# Patient Record
Sex: MALE | Race: WHITE
[De-identification: names, ages, dates, MRNs, and addresses within clinical notes are randomized per-mention and may not be internally consistent; named-entity substitution may affect disease eponyms.]

---

## 2017-02-03 ENCOUNTER — HOSPITAL ENCOUNTER (OUTPATIENT)
Dept: HOSPITAL 80 - FED | Age: 62
Setting detail: OBSERVATION
LOS: 1 days | Discharge: NURSING, REHAB OR LONG-TERM CARE | End: 2017-02-04
Attending: INTERNAL MEDICINE | Admitting: INTERNAL MEDICINE
Payer: MEDICAID

## 2017-02-03 DIAGNOSIS — I10: ICD-10-CM

## 2017-02-03 DIAGNOSIS — J70.4: ICD-10-CM

## 2017-02-03 DIAGNOSIS — Z79.2: ICD-10-CM

## 2017-02-03 DIAGNOSIS — E11.9: ICD-10-CM

## 2017-02-03 DIAGNOSIS — Z87.440: ICD-10-CM

## 2017-02-03 DIAGNOSIS — E66.01: ICD-10-CM

## 2017-02-03 DIAGNOSIS — G47.33: ICD-10-CM

## 2017-02-03 DIAGNOSIS — T46.4X5A: ICD-10-CM

## 2017-02-03 DIAGNOSIS — F17.200: ICD-10-CM

## 2017-02-03 DIAGNOSIS — Z79.01: ICD-10-CM

## 2017-02-03 DIAGNOSIS — F41.9: ICD-10-CM

## 2017-02-03 DIAGNOSIS — Z96.651: ICD-10-CM

## 2017-02-03 DIAGNOSIS — E87.1: ICD-10-CM

## 2017-02-03 DIAGNOSIS — Z86.711: ICD-10-CM

## 2017-02-03 DIAGNOSIS — T78.3XXA: Primary | ICD-10-CM

## 2017-02-03 DIAGNOSIS — T84.53XD: ICD-10-CM

## 2017-02-03 DIAGNOSIS — I50.32: ICD-10-CM

## 2017-02-03 DIAGNOSIS — E87.5: ICD-10-CM

## 2017-02-03 DIAGNOSIS — F25.9: ICD-10-CM

## 2017-02-03 DIAGNOSIS — K21.9: ICD-10-CM

## 2017-02-03 DIAGNOSIS — J44.9: ICD-10-CM

## 2017-02-03 DIAGNOSIS — N31.9: ICD-10-CM

## 2017-02-03 LAB
% IMMATURE GRANULYOCYTES: 0.7 % (ref 0–1.1)
ABSOLUTE IMMATURE GRANULOCYTES: 0.03 10^3/UL (ref 0–0.1)
ABSOLUTE NRBC COUNT: 0 10^3/UL (ref 0–0.01)
ADD DIFF?: NO
ADD MORPH?: NO
ADD SCAN?: NO
ANION GAP SERPL CALC-SCNC: 6 MEQ/L (ref 8–16)
ANION GAP SERPL CALC-SCNC: 6 MEQ/L (ref 8–16)
ANION GAP SERPL CALC-SCNC: 7 MEQ/L (ref 8–16)
APTT BLD: 30.3 SEC (ref 23–38)
ATYPICAL LYMPHOCYTE FLAG: 0 (ref 0–99)
CALCIUM SERPL-MCNC: 8.3 MG/DL (ref 8.5–10.4)
CALCIUM SERPL-MCNC: 8.6 MG/DL (ref 8.5–10.4)
CALCIUM SERPL-MCNC: 8.9 MG/DL (ref 8.5–10.4)
CHLORIDE SERPL-SCNC: 96 MEQ/L (ref 97–110)
CHLORIDE SERPL-SCNC: 96 MEQ/L (ref 97–110)
CHLORIDE SERPL-SCNC: 97 MEQ/L (ref 97–110)
CO2 SERPL-SCNC: 28 MEQ/L (ref 22–31)
CREAT SERPL-MCNC: 0.8 MG/DL (ref 0.7–1.3)
CREAT SERPL-MCNC: 0.9 MG/DL (ref 0.7–1.3)
CREAT SERPL-MCNC: 0.9 MG/DL (ref 0.7–1.3)
ERYTHROCYTE [DISTWIDTH] IN BLOOD BY AUTOMATED COUNT: 13.4 % (ref 11.5–15.2)
FRAGMENT RBC FLAG: 0 (ref 0–99)
GFR SERPL CREATININE-BSD FRML MDRD: > 60 ML/MIN/{1.73_M2}
GLUCOSE SERPL-MCNC: 103 MG/DL (ref 70–100)
GLUCOSE SERPL-MCNC: 106 MG/DL (ref 70–100)
GLUCOSE SERPL-MCNC: 106 MG/DL (ref 70–100)
HCT VFR BLD CALC: 40.8 % (ref 40–51)
HGB BLD-MCNC: 14.1 G/DL (ref 13.7–17.5)
INR PPP: 1.18 (ref 0.83–1.16)
LEFT SHIFT FLG: 0 (ref 0–99)
LIPEMIA HEMOLYSIS FLAG: 90 (ref 0–99)
MCH RBC BLDCO QN: 33.8 PG (ref 27.9–34.1)
MCHC RBC AUTO-ENTMCNC: 34.6 G/DL (ref 32.4–36.7)
MCV RBC AUTO: 97.8 FL (ref 81.5–99.8)
NRBC-AUTO%: 0 % (ref 0–0.2)
PLATELET # BLD: 140 10^3/UL (ref 150–400)
PLATELET CLUMPS FLAG: 10 (ref 0–99)
PMV BLD AUTO: 10.5 FL (ref 8.7–11.7)
POTASSIUM SERPL-SCNC: 4.8 MEQ/L (ref 3.5–5.2)
POTASSIUM SERPL-SCNC: 5.4 MEQ/L (ref 3.5–5.2)
POTASSIUM SERPL-SCNC: 5.5 MEQ/L (ref 3.5–5.2)
PROTHROMBIN TIME: 15 SEC (ref 12–15)
RBC # BLD AUTO: 4.17 10^6/UL (ref 4.4–6.38)
SODIUM SERPL-SCNC: 130 MEQ/L (ref 134–144)
SODIUM SERPL-SCNC: 131 MEQ/L (ref 134–144)
SODIUM SERPL-SCNC: 131 MEQ/L (ref 134–144)

## 2017-02-03 PROCEDURE — 99285 EMERGENCY DEPT VISIT HI MDM: CPT

## 2017-02-03 PROCEDURE — G0378 HOSPITAL OBSERVATION PER HR: HCPCS

## 2017-02-03 PROCEDURE — 97165 OT EVAL LOW COMPLEX 30 MIN: CPT

## 2017-02-03 RX ADMIN — FUROSEMIDE SCH MG: 20 TABLET ORAL at 13:22

## 2017-02-03 RX ADMIN — IPRATROPIUM BROMIDE AND ALBUTEROL SCH PUFFS: 20; 100 SPRAY, METERED RESPIRATORY (INHALATION) at 20:32

## 2017-02-03 RX ADMIN — ACETAMINOPHEN SCH MG: 500 TABLET ORAL at 13:19

## 2017-02-03 RX ADMIN — ACETAMINOPHEN SCH: 500 TABLET ORAL at 23:43

## 2017-02-03 RX ADMIN — CALCIUM SCH MG: 500 TABLET ORAL at 20:05

## 2017-02-03 RX ADMIN — AMOXICILLIN AND CLAVULANATE POTASSIUM SCH MG: 875; 125 TABLET, FILM COATED ORAL at 20:05

## 2017-02-03 RX ADMIN — OXYBUTYNIN CHLORIDE SCH MG: 5 TABLET ORAL at 20:05

## 2017-02-03 RX ADMIN — CHLORHEXIDINE GLUCONATE SCH ML: 1.2 RINSE ORAL at 20:06

## 2017-02-03 NOTE — GHP
[f 
rep st]



                                                            HISTORY AND PHYSICAL





DATE OF ADMISSION:  2017



DATE OF EVALUATION:  2017



CHIEF COMPLAINT:  Tongue swelling.



HISTORY OF PRESENT ILLNESS:  This is a 61-year-old man who is a permanent [place] resident who presents with tongue swelling.  He was started on lisinopril 
for hypertension about 3 months ago.  He has never had any episodes of this 
before.  It started last night and got progressively worse through the night.  
He has no problems breathing.  He is having difficulty speaking due to the size 
of his tongue.  He feels as though his tongue is actually improving at this 
point as his speaking has become easier than it was previously. 



He has a history of PE, on Coumadin.  Recent dose adjustments appeared to have 
been made as he had an INR of 4.1, on 2017.



PAST MEDICAL/SURGICAL HISTORY:  

1.  History of a pulmonary embolus, on Coumadin, with subtherapeutic INR.

2.  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 
suppressive antibiotics.

3.  History of recurrent urinary tract infections, including ESBL.

4.  Neurogenic bladder.

5.  Type 2 diabetes.

6.  Schizoaffective disorder.

7.  Diastolic CHF.

8.  Obstructive sleep apnea.

9.  Anxiety.

10.  Macrodantin-induced pneumonitis.

11.  Chronic hyponatremia.

12.  Chronic lower extremity edema.

13.  COPD.

14.  GERD.



MEDICATIONS:  Please see medication reconciliation.



ALLERGIES:  Atenolol, beta adrenergic agents, sotalol, Macrobid, lisinopril.



SOCIAL HISTORY:  He lives in [place].  He smokes.  His sister is his POA.



FAMILY HISTORY:  Father  from trauma.



REVIEW OF SYSTEMS:  A 10-point review of systems is conducted and is negative 
except per HPI.



PHYSICAL EXAMINATION:  VITAL SIGNS:  Blood pressure 130/88, heart rate 67, 
respiration rate 20, saturating 95% on 1.5 L.  GENERAL:  The patient is a 
pleasant man who is lying in bed, comfortable, in no acute distress.  HEENT:  
Shows him to have a significantly edematous tongue.  He is able to speak, open 
his mouth.  I can barely see the upper back part of his palate.  CARDIOVASCULAR
:  Regular rate and rhythm.  No murmurs, rubs, or gallops.  PULMONARY:  Lungs 
are clear to auscultation bilaterally.  ABDOMEN:  Soft, nontender, 
nondistended.  SKIN:  No rash. :  A Castaneda in place.  NEUROLOGIC:  Shows him 
to be alert and oriented x3.  He is moving all extremities.  PSYCHIATRIC:  
Normal mood and affect.



LABORATORY DATA:  CBC is unremarkable.  INR is 1.18, sodium 131, potassium 5.5.
  



I reviewed his chart, including his chart from [facility], as 
well as [place] chart.



IMPRESSION AND PLAN:  A 61-year-old man who presents with angioedema.

1.  Angioedema:  ACE inhibitor induced.  Primary role is supportive.  It seems 
to be slowly improving at this point.  If it worsens, he may need intubation 
with mechanical ventilation.  Airway may be difficult, would potentially need 
emergent tracheostomy.  Monitor him on pulse oximetry.  Will give one dose of 
pepcid and benadryl.  Adjunctive treatments include Icatibant , FFP, purified 
C1 inhibitor concentrate, or Ecallantide.  I have added lisinopril to his 
allergy list.  We will hold this.

2.  Hyperkalemia.  This seems to be somewhat of a chronic problem for him.  I 
have written to recheck his labs.

3.  Hyponatremia, also somewhat of a chronic problem.  We will follow.

4.  COPD.  He has somewhat chronic respiratory failure, on oxygen.  Will 
continue.

5.  History of infected knee replacement.  We will continue his suppressive 
antibiotics.

6.  History of a PE with subtherapeutic INR.  We will continue his warfarin and 
recheck his INR tomorrow.

7.  Schizoaffective disorder.  Continue his medicines.

8.  Chronic diastolic dysfunction and lower extremity edema:  We will continue 
his furosemide.

9.  Hypertension.  Hold lisinopril.  Restart his other medicines.  May need to 
add adjunctive treatment.

10.  Diabetes mellitus.  Continue his oral medicines.

11.  Code status is full.  His sister is his POA.

12.  VTE risk.  He is currently admitted as obs.  If he stays, would start 
either bridging-dose Lovenox or at least therapeutic-dose Lovenox.





Job #:  [id number]

MTDD

## 2017-02-03 NOTE — EDPHY
HPI/HX/ROS/PE/MDM


Narrative: 


CHIEF COMPLAINT: Tongue swelling





HPI: The patient is a 62 y/o male arriving via EMS complaining of worsening 

tongue swelling since midnight, about 7 hours prior to arrival. He has a 

history of hypertension for which he was started on lisinopril a few months 

ago. He has never had this reaction before. He denies new exposures to foods, 

changes in medications, or insect bites. No shortness of breath, sensation of 

throat tightness, difficulty speaking or talking, or other complaints. 





REVIEW OF SYSTEMS:


Aside from elements discussed in the HPI, a comprehensive 10-point review of 

systems was reviewed and is negative.





PMH: Type 2 diabetes, schizoaffective disorder, CHF with diastolic dysfunction, 

anxiety, right knee arthroplasty, recurrent UTIs with chronic Castaneda, chronic 

hyponatremia, anxiety, DVT on Coumadin





Prior medical records reviewed including admission 1/17/14 for malaise. 





SOCIAL HISTORY: Lives at [place]





PHYSICAL EXAM:


General:Patient is alert, in no acute distress.


ENT:Eyes are normal to inspection.  Enlarged tongue, mild drooling, normal 

voice. ENT inspection otherwise normal


Neck: Normal inspection.  Full range of motion.


Respiratory:No respiratory distress.  Breath sounds normal bilaterally.


Cardiovascular: Regular rate and rhythm.  Strong peripheral pulses.  Normal cap 

refill.


Abdomen:The abdomen is nontender to palpation. There are no peritoneal signs. 

There are normal bowel sounds.


Back: Normal to inspection.  No tenderness to palpation.


Skin: Normal color.  No rash.  Warm and dry.


Extremities: Full range of motion. 1-2+ pitting edema bilaterally


Neuro: Oriented x3.  Normal motor function.  Normal sensory function.


ED Course: 


0720: Met EMS upon arrival and took report. IV established. Labs drawn 

including CBC, CHEM, PTPTT, BNP. Plan for admission for angioedema.





0855: Spoke with hospitalist service. Dr. Herrera accepts admission. 


MDM: 


This patient presents with swollen tongue that appears to be secondary to 

angioedema.  Given recent initiation of lisinopril therapy, I suspect this is 

likely the etiology.  The patient has a stable airway here and does not require 

intubation.  He requires admission to the hospital for further observation and 

treatment however.





- Data Points


Laboratory Results: 


 Laboratory Results





 02/03/17 07:35 





 02/03/17 07:35 





 











  02/03/17





  07:35


 


WBC  4.56 10^3/uL





   (3.80-9.50) 


 


RBC  4.17 L 10^6/uL





   (4.40-6.38) 


 


Hgb  14.1 g/dL





   (13.7-17.5) 


 


Hct  40.8 %





   (40.0-51.0) 


 


MCV  97.8 fL





   (81.5-99.8) 


 


MCH  33.8 pg





   (27.9-34.1) 


 


MCHC  34.6 g/dL





   (32.4-36.7) 


 


RDW  13.4 %





   (11.5-15.2) 


 


Plt Count  140 L 10^3/uL





   (150-400) 


 


MPV  10.5 fL





   (8.7-11.7) 


 


Neut % (Auto)  66.0 %





   (39.3-74.2) 


 


Lymph % (Auto)  17.3 %





   (15.0-45.0) 


 


Mono % (Auto)  13.6 H %





   (4.5-13.0) 


 


Eos % (Auto)  2.0 %





   (0.6-7.6) 


 


Baso % (Auto)  0.4 %





   (0.3-1.7) 


 


Nucleat RBC Rel Count  0.0 %





   (0.0-0.2) 


 


Absolute Neuts (auto)  3.01 10^3/uL





   (1.70-6.50) 


 


Absolute Lymphs (auto)  0.79 L 10^3/uL





   (1.00-3.00) 


 


Absolute Monos (auto)  0.62 10^3/uL





   (0.30-0.80) 


 


Absolute Eos (auto)  0.09 10^3/uL





   (0.03-0.40) 


 


Absolute Basos (auto)  0.02 10^3/uL





   (0.02-0.10) 


 


Absolute Nucleated RBC  0.00 10^3/uL





   (0-0.01) 


 


Immature Gran %  0.7 %





   (0.0-1.1) 


 


Immature Gran #  0.03 10^3/uL





   (0.00-0.10) 


 


PT  15.0 SEC





   (12.0-15.0) 


 


INR  1.18 H 





   (0.83-1.16) 


 


APTT  30.3 SEC





   (23.0-38.0) 


 


Sodium  131 L mEq/L





   (134-144) 


 


Potassium  5.5 H mEq/L





   (3.5-5.2) 


 


Chloride  97 mEq/L





   () 


 


Carbon Dioxide  28 mEq/l





   (22-31) 


 


Anion Gap  6 mEq/L





   (8-16) 


 


BUN  28 H mg/dL





   (7-23) 


 


Creatinine  0.9 mg/dL





   (0.7-1.3) 


 


Estimated GFR  > 60 





  


 


Glucose  103 H mg/dL





   () 


 


Calcium  8.6 mg/dL





   (8.5-10.4) 


 


NT-Pro-B Natriuret Pep  144 H pg/mL





   (0-125) 














General


Initial Vital Signs: 


 Initial Vital Signs











Temperature (C)  36.5 C   02/03/17 07:20


 


Heart Rate  71   02/03/17 07:20


 


Respiratory Rate  20   02/03/17 07:20


 


Blood Pressure  119/93 H  02/03/17 07:20


 


O2 Sat (%)  90 L  02/03/17 07:20








 











O2 Delivery Mode               Room Air


 


O2 (L/minute)                  2














Allergies/Adverse Reactions: 


 





atenolol Allergy (Unknown, Verified 01/17/14 16:09)


 Unknown


Beta-Adrenergic Agents Allergy (Unknown, Verified 01/17/14 16:09)


 Unknown


hydrochlorothiazide Allergy (Unknown, Verified 01/17/14 16:09)


 Unknown


sotalol [Sotalol] Allergy (Unknown, Verified 01/17/14 16:09)


 Unknown


nitrofurantoin [From Macrobid] Allergy (Verified 01/17/14 16:09)


 


nitrofurantoin macrocrystalline [From Macrobid] Allergy (Verified 01/17/14 16:09

)


 








Home Medications: 














 Medication  Instructions  Recorded


 


Albuterol  02/03/17


 


Amox Tr/Potassium Clavulanate  02/03/17





[Augmentin 1000MG ER Tablet (*)]  


 


CHLORHEX GL/GLYCERIN/HE-CELL  02/03/17


 


CLONAZEPAM  02/03/17


 


Calcium Carbonate  02/03/17


 


Divalproex ER [Depakote  MG  02/03/17





(*)]  


 


Dulcolax  02/03/17


 


FLUoxetine  02/03/17


 


Ipratropium/Albuterol  02/03/17


 


Lactobacillus Combo No.11  02/03/17





[Probiotic]  


 


Lasix  02/03/17


 


Lisinopril  02/03/17


 


MAGNESIUM  02/03/17


 


Magnesium Oxide  02/03/17


 


Metformin 1000 mg  02/03/17


 


QUEtiapine FUMARATE [Seroquel 100  02/03/17





mg (*)]  


 


THERMOTABS TABLET  02/03/17


 


Tylenol  02/03/17


 


Warfarin Sodium [Coumadin] 5 mg PO 02/03/17














Departure





- Departure


Disposition: [facility] Inpatient Acute


Clinical Impression: 


Angioedema


Qualifiers:


 Qualifier Code: (T78.3XXA) Angioneurotic edema, initial encounter


Condition: Fair


Report Scribed for: Gen Torres


Report Scribed by: Lisa Conroy


Date of Report: 02/03/17


Time of Report: 07:25


Physician Review and Approval Statement: Portions of this note were transcribed 

by an ED scribe.  I personally performed the history, physical exam, and 

medical decision making; and confirm the accuracy of the information in the 

transcribed note.

## 2017-02-04 VITALS — RESPIRATION RATE: 13 BRPM

## 2017-02-04 VITALS
SYSTOLIC BLOOD PRESSURE: 150 MMHG | HEART RATE: 73 BPM | TEMPERATURE: 97.9 F | DIASTOLIC BLOOD PRESSURE: 92 MMHG | OXYGEN SATURATION: 91 %

## 2017-02-04 LAB
% IMMATURE GRANULYOCYTES: 0.5 % (ref 0–1.1)
ABSOLUTE IMMATURE GRANULOCYTES: 0.02 10^3/UL (ref 0–0.1)
ABSOLUTE NRBC COUNT: 0 10^3/UL (ref 0–0.01)
ADD DIFF?: NO
ADD MORPH?: NO
ADD SCAN?: NO
ANION GAP SERPL CALC-SCNC: 4 MEQ/L (ref 8–16)
ATYPICAL LYMPHOCYTE FLAG: 10 (ref 0–99)
CALCIUM SERPL-MCNC: 8.4 MG/DL (ref 8.5–10.4)
CHLORIDE SERPL-SCNC: 102 MEQ/L (ref 97–110)
CO2 SERPL-SCNC: 29 MEQ/L (ref 22–31)
CREAT SERPL-MCNC: 0.8 MG/DL (ref 0.7–1.3)
ERYTHROCYTE [DISTWIDTH] IN BLOOD BY AUTOMATED COUNT: 13.4 % (ref 11.5–15.2)
FRAGMENT RBC FLAG: 0 (ref 0–99)
GFR SERPL CREATININE-BSD FRML MDRD: > 60 ML/MIN/{1.73_M2}
GLUCOSE SERPL-MCNC: 105 MG/DL (ref 70–100)
HCT VFR BLD CALC: 39.4 % (ref 40–51)
HGB BLD-MCNC: 13.4 G/DL (ref 13.7–17.5)
INR PPP: 1.56 (ref 0.83–1.16)
LEFT SHIFT FLG: 0 (ref 0–99)
LIPEMIA HEMOLYSIS FLAG: 90 (ref 0–99)
MCH RBC BLDCO QN: 33.6 PG (ref 27.9–34.1)
MCHC RBC AUTO-ENTMCNC: 34 G/DL (ref 32.4–36.7)
MCV RBC AUTO: 98.7 FL (ref 81.5–99.8)
NRBC-AUTO%: 0 % (ref 0–0.2)
PLATELET # BLD: 129 10^3/UL (ref 150–400)
PLATELET CLUMPS FLAG: 10 (ref 0–99)
PMV BLD AUTO: 10.6 FL (ref 8.7–11.7)
POTASSIUM SERPL-SCNC: 4.8 MEQ/L (ref 3.5–5.2)
PROTHROMBIN TIME: 18.7 SEC (ref 12–15)
RBC # BLD AUTO: 3.99 10^6/UL (ref 4.4–6.38)
SODIUM SERPL-SCNC: 135 MEQ/L (ref 134–144)

## 2017-02-04 RX ADMIN — FUROSEMIDE SCH MG: 20 TABLET ORAL at 08:21

## 2017-02-04 RX ADMIN — OXYBUTYNIN CHLORIDE SCH MG: 5 TABLET ORAL at 08:22

## 2017-02-04 RX ADMIN — ACETAMINOPHEN SCH MG: 500 TABLET ORAL at 11:22

## 2017-02-04 RX ADMIN — IPRATROPIUM BROMIDE AND ALBUTEROL SCH PUFFS: 20; 100 SPRAY, METERED RESPIRATORY (INHALATION) at 09:40

## 2017-02-04 RX ADMIN — CHLORHEXIDINE GLUCONATE SCH ML: 1.2 RINSE ORAL at 08:15

## 2017-02-04 RX ADMIN — AMOXICILLIN AND CLAVULANATE POTASSIUM SCH MG: 875; 125 TABLET, FILM COATED ORAL at 08:21

## 2017-02-04 RX ADMIN — CALCIUM SCH MG: 500 TABLET ORAL at 08:23

## 2017-02-04 NOTE — GDS
[f rep st]



                                                             DISCHARGE SUMMARY





DIAGNOSES:  

1.  Angioedema secondary to lisinopril.

2.  History of pulmonary embolism on Coumadin.

3.  History of prostatic knee complicated by infection, on chronic suppressive antibiotics.

4.  Recurrent urinary tract infections.

5.  Neurogenic bladder.

6.  Type 2 diabetes.

7.  Schizoaffective disorder.

8.  Diastolic heart failure.

9.  Obstructive sleep apnea.

10.  Anxiety.

11.  Macrodantin-induced pneumonitis.

12.  Chronic hyponatremia.

13.  Chronic lower extremity edema.

14.  Chronic obstructive pulmonary disease.

15.  Gastroesophageal reflux disease.

16.  Morbid obesity.

17.  Hypertension.



HOSPITAL COURSE:  The patient is a 61-year-old, in long-term care at [place].  He comes in with an
gioedema after being started on lisinopril for hypertension.  It started the night prior to admission
 and progressively worsened.  He was admitted overnight, given some steroids, H1 and H2 blockers, and
 at time of discharge he says he is 90% better, and is feeling well.  He has not received any medicat
ions overnight.  Lisinopril was added to his medication list.  His blood pressure is remaining slight
ly elevated so I added Norvasc 5 mg daily to his medication list, for elevated blood pressure.



CONDITION ON DISCHARGE:  Good.  



Vital signs are stable.  O2 saturation is 93% on 3 L.  Blood pressure 142/95.  He is alert and orient
ed.  Heart is regular.  Lungs are clear.



DISCHARGE MEDICATIONS:  Please see discharge medication form.



FOLLOWUP:  He will be discharged back to [facility].  We can provide home oxygen in th
e short-term until he is back to baseline. 



Total time spent with patient on day of discharge is 35 minutes.





Job #:  [id number]

## 2017-02-04 NOTE — PDIAF
- Diagnosis


Diagnosis: angioedema from lisinopril


Code Status: Full Code





- Medication Management


Discharge Medications: 


 Medications to Continue on Transfer





ARIPIPRAZOLE [Abilify 30mg] 30 mg PO DAILY 02/03/17 [Last Taken 02/02/17]


Acetaminophen [Tylenol  mg (*)] 1,000 mg PO DAILY@2200 02/03/17 [Last 

Taken 02/02/17]


Acetaminophen [Tylenol  mg (*)] 1,000 mg PO Q12H 02/03/17 [Last Taken 02/ 03/17 04:00 1000MG]


Albuterol [Proventil Inhaler HFA (*)] 2 puffs IH Q4 PRN 02/03/17 [Last Taken 02/ 02/17]


Amoxicillin/Clavulanate Pot [Augmentin 875 MG TAB (*)] 875 mg PO BID 02/03/17 [

Last Taken 02/02/17]


Bisacodyl [Dulcolax] 10 mg RC Q24H PRN 02/03/17 [Last Taken Unknown]


Calcium Carbonate [Oyster Shell Calcium 500 mg (*)] 500 mg PO BID 02/03/17 [

Last Taken 02/02/17]


Calcium Carbonate [Tums 500MG (*)] 500 mg PO Q12H PRN 02/03/17 [Last Taken 02/02 /17]


Chlorhexidine Gluconate 5 ml PO BID 02/03/17 [Last Taken 02/02/17]


Divalproex ER [Depakote  MG (*)] 1,500 mg PO HS 02/03/17 [Last Taken 02/02 /17]


FLUoxetine [Prozac 20 MG (*)] 20 mg PO DAILY 02/03/17 [Last Taken 02/02/17]


Furosemide [Lasix 20 MG (*)] 50 mg PO DAILY 02/03/17 [Last Taken 02/02/17]


Herbals/Supplements -Info Only 1 ea PO DAILY 02/03/17 [Last Taken 02/02/17]


Ipratropium/Albuterol [Combivent Respimat Inhal [place](*)] 2 inh IH BID 02/03/17 

[Last Taken 02/02/17]


Magnesium Hydroxide [Milk of Magnesia] 30 ml PO Q24H PRN 02/03/17 [Last Taken 

Unknown]


Magnesium Hydroxide/Al Hydrox [Mylanta Liquid] 30 ml PO Q2H PRN 02/03/17 [Last 

Taken Unknown]


Magnesium Oxide [Magnesium Oxide 400 mg (*)] 400 mg PO DAILY 02/03/17 [Last 

Taken 02/02/17]


Oxybutynin Chloride [Ditropan] 10 mg PO BID 02/03/17 [Last Taken 02/02/17]


QUEtiapine FUMARATE [Seroquel 300mg (*)] 300 mg PO BID 02/03/17 [Last Taken 02/ 03/17]


Warfarin Sodium [Coumadin] 10 mg PO HS 02/03/17 [Last Taken 02/02/17]


clonazePAM [Klonopin (*)] 0.25 mg PO BID@11,21 02/03/17 [Last Taken 02/02/17]


guaiFENesin/DEXTROMETHORPHAN [Robitussin Dm Oral Liquid (*)] 10 ml PO Q4H PRN 02 /03/17 [Last Taken Unknown]


metFORMIN HCL [Glucophage 500 mg (*)] 500 mg PO BIDMEAL 02/03/17 [Last Taken 02/ 02/17]


oxyCODONE IR [Oxycodone Ir (*)] 5 mg PO Q12H PRN 02/03/17 [Last Taken Unknown]


Diphenhydramine HCl [Benadryl] 25 mg PO TID PRN #0 capsule 02/04/17 [Last Taken 

Unknown]


Ranitidine HCl 150 mg PO BID PRN #0 tablet 02/04/17 [Last Taken Unknown]


amLODIPine BESYLATE [Norvasc 5 mg (*)] 5 mg PO DAILY #0 tab 02/04/17 [Last 

Taken Unknown]








Discharge Medications: Refer to the Discharge Home Medication list for PRN 

reason.





- Orders


Services needed: Registered Nurse, Certified Nursing Aide, Master 

, Physical Therapy


Diet Recommendation: ADA 2200 consistent carb


Diet Texture: Regular Texture Diet





- Follow Up Care


Current Providers and Referrals: 


Patient,NotPresent [Unknown] - As per Instructions

## 2017-06-27 ENCOUNTER — HOSPITAL ENCOUNTER (OUTPATIENT)
Dept: HOSPITAL 80 - FIMAGING | Age: 62
End: 2017-06-27
Attending: RADIOLOGY
Payer: MEDICAID

## 2017-06-27 DIAGNOSIS — I71.2: Primary | ICD-10-CM

## 2017-06-27 DIAGNOSIS — I10: ICD-10-CM

## 2017-06-27 DIAGNOSIS — I25.10: ICD-10-CM

## 2017-06-27 DIAGNOSIS — I87.1: ICD-10-CM

## 2017-06-27 LAB
CREAT SERPL-MCNC: 1 MG/DL (ref 0.7–1.3)
GFR SERPL CREATININE-BSD FRML MDRD: > 60 ML/MIN/{1.73_M2}

## 2019-06-20 ENCOUNTER — HOSPITAL ENCOUNTER (OUTPATIENT)
Dept: HOSPITAL 80 - FSGY | Age: 64
Discharge: NURSING, REHAB OR LONG-TERM CARE | End: 2019-06-20
Payer: MEDICAID